# Patient Record
Sex: MALE | URBAN - METROPOLITAN AREA
[De-identification: names, ages, dates, MRNs, and addresses within clinical notes are randomized per-mention and may not be internally consistent; named-entity substitution may affect disease eponyms.]

---

## 2021-02-18 ENCOUNTER — CLAIMS CREATED FROM THE CLAIM WINDOW (OUTPATIENT)
Dept: URBAN - METROPOLITAN AREA MEDICAL CENTER 2 | Facility: MEDICAL CENTER | Age: 61
End: 2021-02-18
Payer: OTHER GOVERNMENT

## 2021-02-18 DIAGNOSIS — R16.0 HEPATOMEGALY, NOT ELSEWHERE CLASSIFIED: ICD-10-CM

## 2021-02-18 DIAGNOSIS — R74.8 ABNORMAL LEVELS OF OTHER SERUM ENZYMES: ICD-10-CM

## 2021-02-18 DIAGNOSIS — F10.988 ALCOHOL USE, UNSPECIFIED WITH OTHER ALCOHOL-INDUCED DISORDER: ICD-10-CM

## 2021-02-18 DIAGNOSIS — K80.20 CALCULUS OF GALLBLADDER WITHOUT CHOLECYSTITIS WITHOUT OBSTRUCTION: ICD-10-CM

## 2021-02-18 PROCEDURE — 99254 IP/OBS CNSLTJ NEW/EST MOD 60: CPT | Performed by: INTERNAL MEDICINE

## 2021-02-19 ENCOUNTER — CLAIMS CREATED FROM THE CLAIM WINDOW (OUTPATIENT)
Dept: URBAN - METROPOLITAN AREA MEDICAL CENTER 2 | Facility: MEDICAL CENTER | Age: 61
End: 2021-02-19
Payer: OTHER GOVERNMENT

## 2021-02-19 DIAGNOSIS — R74.8 ABNORMAL LEVELS OF OTHER SERUM ENZYMES: ICD-10-CM

## 2021-02-19 DIAGNOSIS — F10.988 ALCOHOL USE, UNSPECIFIED WITH OTHER ALCOHOL-INDUCED DISORDER: ICD-10-CM

## 2021-02-19 PROCEDURE — 99232 SBSQ HOSP IP/OBS MODERATE 35: CPT | Performed by: INTERNAL MEDICINE

## 2022-01-01 ENCOUNTER — OFFICE VISIT (OUTPATIENT)
Dept: URBAN - METROPOLITAN AREA CLINIC 113 | Facility: CLINIC | Age: 62
End: 2022-01-01

## 2022-01-01 ENCOUNTER — TELEPHONE ENCOUNTER (OUTPATIENT)
Dept: URBAN - METROPOLITAN AREA CLINIC 113 | Facility: CLINIC | Age: 62
End: 2022-01-01

## 2022-01-01 ENCOUNTER — CLAIMS CREATED FROM THE CLAIM WINDOW (OUTPATIENT)
Dept: URBAN - METROPOLITAN AREA MEDICAL CENTER 2 | Facility: MEDICAL CENTER | Age: 62
End: 2022-01-01
Payer: OTHER GOVERNMENT

## 2022-01-01 DIAGNOSIS — K70.31 ALCOHOLIC CIRRHOSIS OF LIVER WITH ASCITES: ICD-10-CM

## 2022-01-01 DIAGNOSIS — F10.230 ALCOHOL DEPENDENCE WITH WITHDRAWAL, UNCOMPLICATED: ICD-10-CM

## 2022-01-01 DIAGNOSIS — F10.988 ALCOHOL USE, UNSPECIFIED WITH OTHER ALCOHOL-INDUCED DISORDER: ICD-10-CM

## 2022-01-01 DIAGNOSIS — K70.10 ACUTE ALCOHOLIC HEPATITIS: ICD-10-CM

## 2022-01-01 DIAGNOSIS — R19.5 ABNORMAL FECES: ICD-10-CM

## 2022-01-01 DIAGNOSIS — R41.82 ALTERED MENTAL STATE: ICD-10-CM

## 2022-01-01 DIAGNOSIS — D62: ICD-10-CM

## 2022-01-01 DIAGNOSIS — D62 ACUTE BLOOD LOSS ANEMIA (ABLA): ICD-10-CM

## 2022-01-01 DIAGNOSIS — K70.30 ALCOHOLIC CIRRHOSIS OF LIVER WITHOUT ASCITES: ICD-10-CM

## 2022-01-01 DIAGNOSIS — K70.30 ALC (ALCOHOLIC LIVER CIRRHOSIS): ICD-10-CM

## 2022-01-01 DIAGNOSIS — N17.9 ACUTE KIDNEY FAILURE, UNSPECIFIED: ICD-10-CM

## 2022-01-01 DIAGNOSIS — K92.1 MELENA: ICD-10-CM

## 2022-01-01 DIAGNOSIS — B96.81 BACTERIAL INFECTION DUE TO H. PYLORI: ICD-10-CM

## 2022-01-01 DIAGNOSIS — R19.5 ABNORMAL CONSISTENCY OF STOOL: ICD-10-CM

## 2022-01-01 DIAGNOSIS — I85.10 ESOPH VARICE OTHER DIS: ICD-10-CM

## 2022-01-01 DIAGNOSIS — R19.5 GUAIAC + STOOL: ICD-10-CM

## 2022-01-01 DIAGNOSIS — K70.10 ALCOHOLIC HEPATITIS WITHOUT ASCITES: ICD-10-CM

## 2022-01-01 DIAGNOSIS — K29.60 ADENOPAPILLOMATOSIS GASTRICA: ICD-10-CM

## 2022-01-01 PROCEDURE — 99232 SBSQ HOSP IP/OBS MODERATE 35: CPT | Performed by: INTERNAL MEDICINE

## 2022-01-01 PROCEDURE — 99254 IP/OBS CNSLTJ NEW/EST MOD 60: CPT | Performed by: INTERNAL MEDICINE

## 2022-01-01 PROCEDURE — 99222 1ST HOSP IP/OBS MODERATE 55: CPT | Performed by: INTERNAL MEDICINE

## 2022-01-01 PROCEDURE — 43239 EGD BIOPSY SINGLE/MULTIPLE: CPT | Performed by: INTERNAL MEDICINE

## 2022-03-21 ENCOUNTER — PREPPED CHART (OUTPATIENT)
Dept: URBAN - METROPOLITAN AREA CLINIC 20 | Facility: CLINIC | Age: 62
End: 2022-03-21

## 2022-04-11 ENCOUNTER — CONSULTATION/EVALUATION (OUTPATIENT)
Dept: URBAN - METROPOLITAN AREA CLINIC 20 | Facility: CLINIC | Age: 62
End: 2022-04-11

## 2022-04-11 VITALS
HEIGHT: 67 IN | SYSTOLIC BLOOD PRESSURE: 131 MMHG | HEART RATE: 66 BPM | DIASTOLIC BLOOD PRESSURE: 76 MMHG | WEIGHT: 181 LBS | BODY MASS INDEX: 28.41 KG/M2

## 2022-04-11 DIAGNOSIS — H35.363: ICD-10-CM

## 2022-04-11 DIAGNOSIS — H25.813: ICD-10-CM

## 2022-04-11 PROCEDURE — 92136 OPHTHALMIC BIOMETRY: CPT

## 2022-04-11 PROCEDURE — 92004 COMPRE OPH EXAM NEW PT 1/>: CPT

## 2022-04-11 PROCEDURE — 92134 CPTRZ OPH DX IMG PST SGM RTA: CPT

## 2022-04-11 PROCEDURE — 92025 CPTRIZED CORNEAL TOPOGRAPHY: CPT

## 2022-04-11 ASSESSMENT — TONOMETRY
OS_IOP_MMHG: 9
OD_IOP_MMHG: 10

## 2022-04-11 ASSESSMENT — KERATOMETRY
OS_AXISANGLE_DEGREES: 001
OD_K1POWER_DIOPTERS: 43.75
OD_K2POWER_DIOPTERS: 43.75
OD_AXISANGLE_DEGREES: 180
OS_K1POWER_DIOPTERS: 43.50
OS_AXISANGLE2_DEGREES: 91
OS_K2POWER_DIOPTERS: 43.50
OD_AXISANGLE2_DEGREES: 090

## 2022-04-11 ASSESSMENT — VISUAL ACUITY
OD_SC: 20/70
OU_SC: 20/50
OS_SC: 20/50

## 2022-04-11 NOTE — PATIENT DISCUSSION
The patient was informed that with the Basic + option, they will most likely need prescription glasses at all focal points after surgery. The patient elects Basic + OU, goal of emmetropia.

## 2022-04-11 NOTE — PATIENT DISCUSSION
Surgery Counseling: I have discussed the option of scheduling surgery versus following, as well as the risks, benefits, and alternatives of cataract surgery with the patient. It was explained that the surgery is medically indicated at this time, and it can be performed at the patient's option as delaying will cause no further deterioration, therefore there is no rush and there is no harm in waiting to have surgery. It was also explained that there is no guarantee that removing the cataract will improve their vision. The patient understands and desires to proceed with cataract surgery with the implantation of an intraocular lens to improve vision for OU. I have given the patient the prescribed regimen of the all-in-one drop to use before and after cataract surgery. They have elected to use the all-in-one option of Pred/Gati/Brom (prednisolone acetate, gatifloxacin, and bromfenac. Patient to administer as directed.

## 2022-04-28 ENCOUNTER — OFFICE VISIT (OUTPATIENT)
Dept: URBAN - METROPOLITAN AREA CLINIC 113 | Facility: CLINIC | Age: 62
End: 2022-04-28

## 2022-04-28 NOTE — HPI-TODAY'S VISIT:
62-year-old male was [place] [facility] [place] bleed.  He has been admitted multiple times in the past secondary to alcohol withdrawal.  He presented with hallucinations and continued alcohol abuse.  Admission hemoglobin of 6.7.  Patient was unable to provide much history though reported dark stools and black material in emesis.  He was planned for an EGD.  EGD performed with Dr. Isabel on 3/17/2022 revealed normal duodenum, nonbleeding gastric ulcer with no stigmata of bleeding was biopsied, esophageal mucosal changes suggestive of short segment Renee's esophagus, and nonbleeding grade 2 esophageal varices. Melena was suspected to be secondary to peptic ulcer disease due to NSAID use.  Small esophageal varices were not likely the source of bleed.  He was recommended to go home on a twice daily PPI for 30 days followed by daily PPI. Imaging studies revealed cirrhotic morphology of the liver with a meld score of 15.  Labs were notable for thrombocytopenia, coagulopathy with INR 1.3 and low albumin.  He was encephalopathic.  He was started on lactulose with Xifaxan and continued on ceftriaxone for SBP prophylaxis. Hospital labs 4/18/2022:Hemoglobin 10.5, hematocrit 29.0, normal MCV, low platelets of 51, immature platelet fraction elevated at 15, elevated TB of 2.6, elevated , normal ALT, elevated , low albumin 2.7, normal GFR, trace ketones in urine, epithelial cells in urine, hyaline casts in urine and elevated protein in urine.